# Patient Record
Sex: FEMALE | Race: WHITE
[De-identification: names, ages, dates, MRNs, and addresses within clinical notes are randomized per-mention and may not be internally consistent; named-entity substitution may affect disease eponyms.]

---

## 2018-04-20 ENCOUNTER — HOSPITAL ENCOUNTER (EMERGENCY)
Dept: HOSPITAL 62 - ER | Age: 28
Discharge: HOME | End: 2018-04-20
Payer: SELF-PAY

## 2018-04-20 VITALS — SYSTOLIC BLOOD PRESSURE: 105 MMHG | DIASTOLIC BLOOD PRESSURE: 64 MMHG

## 2018-04-20 DIAGNOSIS — M79.1: ICD-10-CM

## 2018-04-20 DIAGNOSIS — F17.200: ICD-10-CM

## 2018-04-20 DIAGNOSIS — J02.8: Primary | ICD-10-CM

## 2018-04-20 DIAGNOSIS — R50.9: ICD-10-CM

## 2018-04-20 PROCEDURE — 99282 EMERGENCY DEPT VISIT SF MDM: CPT

## 2018-04-20 NOTE — ER DOCUMENT REPORT
ED General





- General


Chief Complaint: Sore Throat


Stated Complaint: SORE THROAT, BODY ACHES, FEVER


Time Seen by Provider: 18 19:15


Notes: 





Patient presents with 2 days of sore throat and swollen tonsils.  Patient has 

no respiratory compromise.  She states that she has had a temperature of up to 

101.  She denies any cough or congestion.  She does have a history of strep 

throat in the past.


TRAVEL OUTSIDE OF THE [place] IN LAST 30 DAYS: No





- Related Data


Allergies/Adverse Reactions: 


 





No Known Allergies Allergy (Verified 18 17:54)


 











Past Medical History





- Social History


Smoking Status: Current Every Day Smoker


Chew tobacco use (# tins/day): No


Frequency of alcohol use: None


Drug Abuse: None


Family History: Reviewed & Not Pertinent


Patient has suicidal ideation: No


Patient has homicidal ideation: No





- Past Medical History


Cardiac Medical History: 


   Denies: Hx Coronary Artery Disease, Hx Hypertension


Pulmonary Medical History: 


   Denies: Hx Asthma


Endocrine Medical History: Denies: Hx Diabetes Mellitus Type 1, Hx Diabetes 

Mellitus Type 2


Renal/ Medical History: Denies: Hx Peritoneal Dialysis


GI Medical History: Denies: Hx Gastroesophageal Reflux Disease, Hx Hiatal Hernia

, Hx Ulcer


Past Surgical History: Reports: Hx  Section - x3





- Immunizations


Hx Diphtheria, Pertussis, Tetanus Vaccination: Yes





Review of Systems





- Review of Systems


Constitutional: No symptoms reported


EENT: Other - Swollen tender tonsils and swollen bilateral cervical lymph nodes


Cardiovascular: No symptoms reported


Respiratory: No symptoms reported


Gastrointestinal: No symptoms reported


Genitourinary: No symptoms reported


Female Genitourinary: No symptoms reported


Musculoskeletal: No symptoms reported


Skin: No symptoms reported


Hematologic/Lymphatic: No symptoms reported


Neurological/Psychological: No symptoms reported





Physical Exam





- Vital signs


Vitals: 


 











Temp Pulse Resp BP Pulse Ox


 


 98 F   82   18   105/64   97 


 


 18 18:08  18 18:08  18 18:08  18 18:08  18 18:08














- General


General appearance: Appears well, Alert





- HEENT


Head: Normocephalic, Atraumatic


Notes: 





Bilateral anterior superior cervical lymphadenopathy with swollen erythematous 

tonsils bilaterally with purulence with no evidence of peritonsillar abscess or 

retropharyngeal abscess with uvula midline





- Respiratory


Respiratory status: No respiratory distress


Chest status: Nontender


Breath sounds: Normal





- Cardiovascular


Rhythm: Regular


Heart sounds: Normal auscultation


Murmur: Yes





- Abdominal


Inspection: Normal


Distension: No distension


Bowel sounds: Normal


Tenderness: Nontender





Course





- Re-evaluation


Re-evalutation: 





18 19:18


Patient well-appearing in no acute distress.  Patient meets Center score to 

treat with antibiotics.  Strep test will still be obtained for culture purposes.





- Vital Signs


Vital signs: 


 











Temp Pulse Resp BP Pulse Ox


 


 98 F   82   18   105/64   97 


 


 18 18:08  18 18:08  18 18:08  18 18:08  18 18:08














Discharge





- Discharge


Clinical Impression: 


Acute pharyngitis


Qualifiers:


 Pharyngitis/tonsillitis etiology: other specified organisms Qualified Code(s): 

J02.8 - Acute pharyngitis due to other specified organisms





Disposition: HOME, SELF-CARE


Instructions:  Penicillin V K ([facility]), Sore Throat ([facility]), Strep Throat ([facility])


Additional Instructions: 


Please return to the emergency department if symptoms are not improving or 

worsening.